# Patient Record
Sex: MALE | Race: WHITE
[De-identification: names, ages, dates, MRNs, and addresses within clinical notes are randomized per-mention and may not be internally consistent; named-entity substitution may affect disease eponyms.]

---

## 2023-08-08 ENCOUNTER — HOSPITAL ENCOUNTER (OUTPATIENT)
Dept: HOSPITAL 47 - ORPAIN | Age: 73
Discharge: HOME | End: 2023-08-08
Attending: ANESTHESIOLOGY
Payer: COMMERCIAL

## 2023-08-08 VITALS — DIASTOLIC BLOOD PRESSURE: 83 MMHG | HEART RATE: 89 BPM | SYSTOLIC BLOOD PRESSURE: 152 MMHG

## 2023-08-08 VITALS — RESPIRATION RATE: 18 BRPM

## 2023-08-08 VITALS — TEMPERATURE: 99.1 F

## 2023-08-08 DIAGNOSIS — M51.16: Primary | ICD-10-CM

## 2023-08-08 DIAGNOSIS — M47.26: ICD-10-CM

## 2023-08-08 PROCEDURE — 62323 NJX INTERLAMINAR LMBR/SAC: CPT

## 2023-08-08 NOTE — P.PCN
Date of Procedure: 08/08/23


Procedure(s) Performed: 


 


PREOPERATIVE DIAGNOSIS: 


1- Lumbar Degenerative Disc Diseases


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


3-lumbar radiculopathy


POSTOPERATIVE DIAGNOSIS: 


1-lumbar degenerative disc disease.


2-lumbar spondylosis with  facet arthropathy without myelopathy.


3-lumbar radiculopathy


PROCEDURE


1. Lumbar epidural steroid injection under fluoroscopic guidance at the L4-5 

level.  (Left paramedian approach)   (Fluoroscopy imaging was available in 

radiology department)


2. Lumbar epidurogram. 


ANESTHESIA: Lidocaine 1% 3 and then only.


EBL: Minimal


PROCEDURE INDICATION: The patient with low back pain and radiculitis symptoms 

unresponsive to conservative treatment. Fluoroscopy was used to optimize 

visualization of the needle placement and to maximize safety. 


PROCEDURE DESCRIPTION / TECHNIQUE: 


  The patient was seen and identified in the preoperative area. Risks, benefits,

complications including but not limited to infections ,bleeding ,allergic 

reaction to the medications ,nerve damage and not complete pain releife , and 

alternatives were discussed with the patient. The patient agreed to proceed with

the procedure and signed the consent, and vital signs were stable.


  Patient was taken to the OR and time out was completed. The patient was placed

 in the prone position on procedure table and a pillow was placed under the 

abdomen to reduce lumbar lordosis. The  lumbosacral area was prepped  and draped

in the usual sterile fashion.ere closely monitored during the procedure.  Vital 

signs was monitered during the entire procedure.


Using anterior-posterior fluoroscopy, the L4-5 interlaminar space was identified

and the skin over this site was marked and then infiltrated with 1% lidocaine 

subcutaneously. Subsequently, a 20-gauge Tuohy epidural needle was inserted Left

paramedian at L4 5 level, and advanced toward the epidural space using the 

``Loss of resistance technique and guided by AP and lateral fluoroscopy. The 

correct needle position in the epidural space was verified with the injection of

2 mL of the water soluble contrast dye Isovue 200  contrast and observing an 

excellent epidurogram with the epidural spread of the dye, after negative 

aspiration for blood and CSF and in the absence of paresthesias. Again after 

negative aspiration, a 6  ml mixture containing 60 mg of Depo-medrol ( 

Preservetive Free ), and 2  ml of preservative free Normal Saline, and 2 ml of 

preservative free lidocaine 1% solution was injected and a washout of 

epidurogram was seen. Needle was withdrawn intact, skin was cleansed, and 

bandages were applied. 


COMPLICATIONS: None


DISPOSITION / PLANS: The patient was placed in a supine position and transferred

to the recovery area in a stable condition for observation. There was no 

evidence of lower extremity motor or sensory deficit after the procedure.  

Patient was discharged from the recovery room after meeting discharge criteria. 

Home discharge instructions were given to the patient by the staff. The patient 

was reexamined prior to discharge. The patient will schedule a follow up in the 

clinic in 2-4 weeks.

## 2023-08-08 NOTE — FL
Intraoperative/procedural fluoroscopic services were provided. Total fluoroscopy time is 3.4 seconds 
with a total of 1 submitted images to PACS. Please see the operative/procedural note for further deta
ils.

DAP: [id number] mGym2